# Patient Record
Sex: MALE | NOT HISPANIC OR LATINO | URBAN - METROPOLITAN AREA
[De-identification: names, ages, dates, MRNs, and addresses within clinical notes are randomized per-mention and may not be internally consistent; named-entity substitution may affect disease eponyms.]

---

## 2024-02-21 ENCOUNTER — TELEPHONE (OUTPATIENT)
Dept: PRIMARY CARE | Facility: CLINIC | Age: 74
End: 2024-02-21
Payer: MEDICARE

## 2024-02-21 DIAGNOSIS — G89.29 CHRONIC PAIN OF LEFT KNEE: Primary | ICD-10-CM

## 2024-02-21 DIAGNOSIS — M25.562 CHRONIC PAIN OF LEFT KNEE: Primary | ICD-10-CM

## 2024-02-21 NOTE — TELEPHONE ENCOUNTER
Mr. Jose doesn't want to join your practice but he's requesting an Xray order.  His left knee is bothering him.  He would also like a recommendation for an Ortho physician.  Please advise.  Thanks

## 2024-02-22 NOTE — TELEPHONE ENCOUNTER
Left voicemail for patient.    Left knee x-ray and referral to orthopedics placed.    Patient should schedule/complete knee x-ray prior to appointment with orthopedics.  Potential options for orthopedics include:  Dr. Carson Avery    (I have let patient know that the orthopedic provider may have a PA that works with him/her who would see him initially.  Please remind patient when he calls for appointment).    Lorenzo Vargas MD

## 2024-03-19 ENCOUNTER — APPOINTMENT (OUTPATIENT)
Dept: ORTHOPEDIC SURGERY | Facility: HOSPITAL | Age: 74
End: 2024-03-19
Payer: MEDICARE

## 2024-04-09 ENCOUNTER — OFFICE VISIT (OUTPATIENT)
Dept: ORTHOPEDIC SURGERY | Facility: HOSPITAL | Age: 74
End: 2024-04-09
Payer: MEDICARE

## 2024-04-09 ENCOUNTER — HOSPITAL ENCOUNTER (OUTPATIENT)
Dept: RADIOLOGY | Facility: HOSPITAL | Age: 74
Discharge: HOME | End: 2024-04-09
Payer: MEDICARE

## 2024-04-09 DIAGNOSIS — M17.12 OSTEOARTHRITIS OF LEFT PATELLOFEMORAL JOINT: Primary | ICD-10-CM

## 2024-04-09 DIAGNOSIS — M25.562 LEFT KNEE PAIN, UNSPECIFIED CHRONICITY: ICD-10-CM

## 2024-04-09 PROCEDURE — 73564 X-RAY EXAM KNEE 4 OR MORE: CPT | Mod: LEFT SIDE | Performed by: RADIOLOGY

## 2024-04-09 PROCEDURE — 73564 X-RAY EXAM KNEE 4 OR MORE: CPT | Mod: LT

## 2024-04-09 PROCEDURE — 1159F MED LIST DOCD IN RCRD: CPT | Performed by: FAMILY MEDICINE

## 2024-04-09 PROCEDURE — 99213 OFFICE O/P EST LOW 20 MIN: CPT | Performed by: FAMILY MEDICINE

## 2024-04-09 PROCEDURE — 99203 OFFICE O/P NEW LOW 30 MIN: CPT | Performed by: FAMILY MEDICINE

## 2024-04-09 PROCEDURE — 1125F AMNT PAIN NOTED PAIN PRSNT: CPT | Performed by: FAMILY MEDICINE

## 2024-04-09 ASSESSMENT — PAIN - FUNCTIONAL ASSESSMENT: PAIN_FUNCTIONAL_ASSESSMENT: 0-10

## 2024-04-09 ASSESSMENT — PAIN SCALES - GENERAL: PAINLEVEL_OUTOF10: 7

## 2024-04-09 ASSESSMENT — PAIN DESCRIPTION - DESCRIPTORS: DESCRIPTORS: ACHING;DULL;SHARP

## 2024-04-09 NOTE — LETTER
April 9, 2024     Lorenzo Vargas MD  [address]  [address] [address]    Patient: Akbar Jose   YOB: 1950   Date of Visit: 4/9/2024       Dear Dr. Lorenzo Vargas MD:    Thank you for referring Akbar Jose to me for evaluation. Below are my notes for this consultation.  If you have questions, please do not hesitate to call me. I look forward to following your patient along with you.       Sincerely,     Elvis Avery MD      CC: No Recipients  ______________________________________________________________________________________    Sports Medicine Office Note    Today's Date:  04/09/2024     HPI: Akbar Jose is a 73 y.o.  who presents today for left knee pain.    Today, 4/9/2024, he presents with intermittent left knee pain for the past 6 weeks.  Pain started shortly after he had a ski trip to [facility].  Denies any specific injury or trauma to the knee.  He does have a history of left knee patellar dislocation as a teenager and had an open surgery for his knee at that time.  He has not been taking any routine medications.  Can sometimes go several days without any pain before having a few days of pain.  He is very active, and goes to the gym routinely.  States he has been able to run for 30 minutes on a treadmill at a 4 degree incline without significant discomfort.  There are not any specific movements that seem to bring on his pain.    He has no other complaints.    Physical Examination:     The RIGHT knee is nontender and stable    The LEFT knee has trace joint effusion. Patella crepitus and grind are positive. There is minimal tenderness to the medial and lateral joint lines. Flexion and extension are without mechanical blocking. There is no instability with stress testing.    Skin - no rashes, sores, or open lesions. Strength, sensory and vascular exams are otherwise normal. There is no clubbing, cyanosis or edema.    Gait is slightly  antalgic and tandem.    Imaging:  Radiographs of the left knee obtained today were reviewed and revealed severe patellofemoral and mild medial and lateral tibiofemoral osteoarthrosis.  The studies were reviewed with Dr. Avery personally in the office today.      Problem List Items Addressed This Visit             ICD-10-CM    Osteoarthritis of left patellofemoral joint - Primary M17.12    Relevant Orders    Referral to Physical Therapy     Other Visit Diagnoses         Codes    Left knee pain, unspecified chronicity     M25.562    Relevant Orders    XR knee left 4+ views            Assessment and Plan:     We reviewed the exam and x-ray findings and discussed the conservative and surgical treatment options. We agreed that he is overall doing very well considering the degree of his left knee patellofemoral osteoarthritis.  At this time, I do not feel injections are necessary.  We discussed that physical therapy can be very helpful for this.  He was provided a referral today.  We discussed that he would likely benefit from viscosupplementation in the future if his pain worsens.  He will follow-up as needed.    **This note was dictated using Dragon speech recognition software and was not corrected for spelling or grammatical errors**.    Attestation with edits by Elvis Avery MD at 4/9/2024  4:52 PM:    Attending Note     Trainee role: Fellow    Trainee discussed patient with Dr. Avery          I saw and evaluated the patient. I personally obtained the key and critical portions of the history and physical exam or was physically present for key and critical portions performed by the trainee. I reviewed the trainee's documentation and discussed the patient with the trainee. I agree with the trainee's medical decision making, as documented on the trainee's note.         Elvis Avery MD  Sports Medicine Specialist  [facility] [place]

## 2024-04-09 NOTE — PROGRESS NOTES
Sports Medicine Office Note    Today's Date:  04/09/2024     HPI: Akbar Jose is a 73 y.o.  who presents today for left knee pain.    Today, 4/9/2024, he presents with intermittent left knee pain for the past 6 weeks.  Pain started shortly after he had a ski trip to [facility].  Denies any specific injury or trauma to the knee.  He does have a history of left knee patellar dislocation as a teenager and had an open surgery for his knee at that time.  He has not been taking any routine medications.  Can sometimes go several days without any pain before having a few days of pain.  He is very active, and goes to the gym routinely.  States he has been able to run for 30 minutes on a treadmill at a 4 degree incline without significant discomfort.  There are not any specific movements that seem to bring on his pain.    He has no other complaints.    Physical Examination:     The RIGHT knee is nontender and stable    The LEFT knee has trace joint effusion. Patella crepitus and grind are positive. There is minimal tenderness to the medial and lateral joint lines. Flexion and extension are without mechanical blocking. There is no instability with stress testing.    Skin - no rashes, sores, or open lesions. Strength, sensory and vascular exams are otherwise normal. There is no clubbing, cyanosis or edema.    Gait is slightly antalgic and tandem.    Imaging:  Radiographs of the left knee obtained today were reviewed and revealed severe patellofemoral and mild medial and lateral tibiofemoral osteoarthrosis.  The studies were reviewed with Dr. Avery personally in the office today.      Problem List Items Addressed This Visit             ICD-10-CM    Osteoarthritis of left patellofemoral joint - Primary M17.12    Relevant Orders    Referral to Physical Therapy     Other Visit Diagnoses         Codes    Left knee pain, unspecified chronicity     M25.562    Relevant Orders    XR knee left 4+ views             Assessment and Plan:     We reviewed the exam and x-ray findings and discussed the conservative and surgical treatment options. We agreed that he is overall doing very well considering the degree of his left knee patellofemoral osteoarthritis.  At this time, I do not feel injections are necessary.  We discussed that physical therapy can be very helpful for this.  He was provided a referral today.  We discussed that he would likely benefit from viscosupplementation in the future if his pain worsens.  He will follow-up as needed.    **This note was dictated using Dragon speech recognition software and was not corrected for spelling or grammatical errors**.

## 2024-04-17 ENCOUNTER — APPOINTMENT (OUTPATIENT)
Dept: ORTHOPEDIC SURGERY | Facility: HOSPITAL | Age: 74
End: 2024-04-17
Payer: MEDICARE

## 2024-06-03 ENCOUNTER — HOSPITAL ENCOUNTER (OUTPATIENT)
Dept: RADIOLOGY | Facility: CLINIC | Age: 74
Discharge: HOME | End: 2024-06-03
Payer: MEDICARE

## 2024-06-03 ENCOUNTER — OFFICE VISIT (OUTPATIENT)
Dept: ORTHOPEDIC SURGERY | Facility: CLINIC | Age: 74
End: 2024-06-03
Payer: MEDICARE

## 2024-06-03 DIAGNOSIS — M20.5X2 CLAWTOE, ACQUIRED, LEFT: Primary | ICD-10-CM

## 2024-06-03 DIAGNOSIS — M79.672 LEFT FOOT PAIN: ICD-10-CM

## 2024-06-03 PROCEDURE — 73630 X-RAY EXAM OF FOOT: CPT | Mod: LEFT SIDE | Performed by: RADIOLOGY

## 2024-06-03 PROCEDURE — 99203 OFFICE O/P NEW LOW 30 MIN: CPT | Performed by: ORTHOPAEDIC SURGERY

## 2024-06-03 PROCEDURE — 73630 X-RAY EXAM OF FOOT: CPT | Mod: LT

## 2024-06-03 PROCEDURE — 1160F RVW MEDS BY RX/DR IN RCRD: CPT | Performed by: ORTHOPAEDIC SURGERY

## 2024-06-03 PROCEDURE — 1159F MED LIST DOCD IN RCRD: CPT | Performed by: ORTHOPAEDIC SURGERY

## 2024-06-03 PROCEDURE — 99213 OFFICE O/P EST LOW 20 MIN: CPT | Performed by: ORTHOPAEDIC SURGERY

## 2024-06-03 NOTE — PROGRESS NOTES
This 73-year-old gentleman complains of a left foot deformity with the second toe crossing over and on top of his great toe for the last year.  He denies any injury.  He notes some discomfort over the toe.    Patient does note some numbness and decreased feeling in his left foot for the last 2 years following spine surgery.    Review of systems:  A complete review of the patient's past medical history and review of 30 systems and all medications and allergies was performed. Please see adult medical record for details.    A Family history for genetic or familial inheritance issues and Social history including smoking history, alcohol consumption and exercise activities was also reviewed and significant findings noted in the patients history of present illness.    Physical Exam:  The patient is well-nourished and well-developed and in no acute distress. The patient displayed normal mood and affect. The patient's pupils were equal, round sclerae are white. Patient's respirations appear to be regular and unlabored.    Examination of the patient's left and ankle foot revealed the following. The patient's gait and stance revealed mild pes planus and pronation.  There did not appear to be any skin abnormalities or lymphangitis. There was no swelling noted and no erythema.    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  The second toe was dorsally displaced and medially deviated sitting on top of the great toe.  There was good alignment of the hallux.  There was medial deviation of the the lesser toes.    There was no tenderness to palpation . Ankle range of motion was full. Subtalar motion was full and midtarsal motion was full. The Silfverskiold test was positive.   The neurovascular examination was intact. The neurological exam including motor and sensory exam was performed. The vascular examination including palpation of pulses and capillary refill of the foot was performed and determined to be  intact.    Imaging:  I personally reviewed and measured the radiographs including AP and lateral views of the extremity and they revealed good alignment of the hallux with medial deviation of the second toe and a claw toe deformity abutting the top of the great toe.    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  Additionally has medial deviation of the lesser toes.    I explained to the patient that the only way to  improve his deformity situation would be to correct the second toe.    I would recommend a left second toe claw toe correction with a PIP joint fusion and release of the contracted tissue at the metatarsal phalangeal joint.    The patient should not have the surgery unless he can take care of the foot for the 6 weeks of healing time.  That would mean staying away from the job sites as an .    The risks, options, and procedure were explained to the patient in detail. All of their questions were answered. The patient understands that we cannot make them a new or normal foot and understands the risk of recurrence, the risk of residual joint stiffness, as well as the risks of infection and healing complications. The procedure can be performed as an out patient.  The patient should avoid travel for 3 months following the procedure.    Note dictated with [employer] Capture software.  Completed without full type editing and sent to avoid delay.